# Patient Record
Sex: FEMALE | Race: WHITE | NOT HISPANIC OR LATINO | Employment: OTHER | URBAN - METROPOLITAN AREA
[De-identification: names, ages, dates, MRNs, and addresses within clinical notes are randomized per-mention and may not be internally consistent; named-entity substitution may affect disease eponyms.]

---

## 2023-11-22 ENCOUNTER — TRANSFERRED RECORDS (OUTPATIENT)
Dept: RADIATION THERAPY | Facility: OUTPATIENT CENTER | Age: 83
End: 2023-11-22

## 2023-12-01 ENCOUNTER — TRANSCRIBE ORDERS (OUTPATIENT)
Dept: OTHER | Age: 83
End: 2023-12-01

## 2023-12-01 ENCOUNTER — PATIENT OUTREACH (OUTPATIENT)
Dept: ONCOLOGY | Facility: CLINIC | Age: 83
End: 2023-12-01
Payer: COMMERCIAL

## 2023-12-01 ENCOUNTER — PRE VISIT (OUTPATIENT)
Dept: ONCOLOGY | Facility: CLINIC | Age: 83
End: 2023-12-01
Payer: COMMERCIAL

## 2023-12-01 DIAGNOSIS — C50.911 INFILTRATING DUCTAL CARCINOMA OF BREAST, RIGHT (H): Primary | ICD-10-CM

## 2023-12-01 NOTE — TELEPHONE ENCOUNTER
Action    Action Taken 12/1/2023 2:25pm KEEM   Pt's chart has been marked for merged.     12/4/2023 2:08pm KEEM   I called HIM to follow up on the EPIC merge. Ph: [phone]- I left a detailed vm.     I'm not able to pull [facility] pappass into [facility].      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  She had only been to [facility] for a pill and thought that was at [facility].  IB to try and get a little more info     Imaging Received  December 13, 2023  10:25 AM  TJ   Action: Images from [facility] received and resolved to PACS.  Call to [facility] in film room to resolve mammos     Records Requested  TJ   Clinic name Comments/Action Taken   [facility] December 11, 2023 1:45 PM    Called and requested all breast imaging for past 5 years be pushed to PACS     RECORDS STATUS - ALL OTHER DIAGNOSIS      RECORDS RECEIVED FROM: [facility]    Appt Date: TBD NN WQ   NOTES STATUS DETAILS   OFFICE NOTE from referring provider Complete  infiltrating ductal carcinoma of breast, R  to [facility], [place]  per Dr Xiao Aerllano [place]  [phone]  [phone]    OFFICE NOTE from medical oncologist     DISCHARGE SUMMARY from hospital     DISCHARGE REPORT from the ER     OPERATIVE REPORT     MEDICATION LIST     CLINICAL TRIAL TREATMENTS TO DATE     LABS     PATHOLOGY REPORTS     ANYTHING RELATED TO DIAGNOSIS     GENONOMIC TESTING     TYPE:     IMAGING (NEED IMAGES & REPORT)     CT SCANS     MRI     MAMMO     ULTRASOUND     PET

## 2023-12-01 NOTE — PROGRESS NOTES
New Patient Oncology Nurse Navigator Note     Referring provider: Dr. Tati Hagen     Referring Clinic/Organization: [facility]     Referred to (specialty:) Radiation Oncology     Requested provider (if applicable): [place] location     Date Referral Received: December 1, 2023     Evaluation for:  C50.911 (ICD-10-CM) - Infiltrating ductal carcinoma of breast, right (H)     Clinical History (per Nurse review of records provided):      Patient had bilateral screening mammogram on 9/18/23 and an asymmetry approximately 11 o'clock position of the RIGHT breast, 4 cm from the nipple.  Diagnostic imaging followed on 9/28 and the asymmetry at 11 o'clock area of RIGHT breast is persistent on these spot compression digital diagnostic views and a mass is present with  irregular borders.     This is confirmed on focused RIGHT breast ultrasound, where there is an irregularly marginated hypoechoic mass at 10 o'clock area, 6 cm from nipple, this measures 0.73 x 1.3 x 0.8 cm. Abnormal color flow    9/28/23 - Case: [id number]  A) RIGHT BREAST, 10:00, 6 CM FROM NIPPLE, ULTRASOUND-GUIDED CORE BIOPSY:   1. Invasive ductal carcinoma measuring up to 6 mm in this sampling      a. Andrei grade: II of III; Andrei score: 6 of 9      b. Angio-lymphatic invasion: Absent      c. Associated DCIS: Present      d. Subtype: Cribriform and Solid    2. Breast Ancillary Testing:        a. Hormone Receptors:             Estrogen receptor: Positive (99%, strong staining)             Progesterone receptor: Negative       b. HER2 by IHC: Negative (1+ by manual morphometry)          c. Ki-67: 8%    11/3/23 - Right lumpectomy with sentinel lymph node biopsy - [id number]  A) RIGHT BREAST, WIRE LOCALIZED LUMPECTOMY:   1. Invasive ductal carcinoma, [place] grade II of III       a. Size: 11 mm       b. Core biopsy site is associated with tumor   2. Ductal carcinoma in situ (DCIS), nuclear grade 2, cribriform and micropapillary types   3. Margins:        a. Invasive carcinoma is 6 mm from the superior margin       b. DCIS is 1 mm from the posterior margin   4. Breast Ancillary Testing: Performed on prior case ([id number])       a. Hormone Receptors:             Estrogen receptor: Positive (99%, strong staining)             Progesterone receptor: Negative       b. HER2 by IHC: Negative (1+ by manual morphometry)          c. Ki-67: 8%   5. Atypical lobular hyperplasia (ALH)   6. Radial scar, measuring 2.5 mm   7. Benign skin     B) RIGHT AXILLARY SENTINEL LYMPH NODE #1, EXCISION:   1. Negative for metastatic malignancy in one lymph node (0/1)     C) RIGHT AXILLARY SENTINEL LYMPH NODE #2, EXCISION:   1. Negative for metastatic malignancy in one lymph node (0/1)     D) RIGHT AXILLARY SENTINEL LYMPH NODE #3, EXCISION:   1. Negative for metastatic malignancy in one lymph node (0/1)     E) RIGHT AXILLARY SENTINEL LYMPH NODE #4, EXCISION:   1. Negative for metastatic malignancy in one lymph node (0/1)     Oncotype was ordered off of lumpectomy specimen and RS = 15    Had consult at [facility] but prefers [place].      Records Location: Care Everywhere, Media, and See Bookmarked material     Records Needed:   [facility] [facility] consult notes  All breast imaging for past 5 years (No imaging in PACS as of 12/1)    12/4 14:46 - Telephoned and left voice message for patient requesting call back to assist in scheduling consult.

## 2023-12-14 ENCOUNTER — OFFICE VISIT (OUTPATIENT)
Dept: RADIATION THERAPY | Facility: OUTPATIENT CENTER | Age: 83
End: 2023-12-14
Payer: COMMERCIAL

## 2023-12-14 VITALS
WEIGHT: 167.6 LBS | DIASTOLIC BLOOD PRESSURE: 92 MMHG | TEMPERATURE: 97.5 F | OXYGEN SATURATION: 100 % | HEART RATE: 69 BPM | SYSTOLIC BLOOD PRESSURE: 185 MMHG

## 2023-12-14 DIAGNOSIS — C50.911 MALIGNANT NEOPLASM OF RIGHT BREAST IN FEMALE, ESTROGEN RECEPTOR POSITIVE, UNSPECIFIED SITE OF BREAST (H): Primary | ICD-10-CM

## 2023-12-14 DIAGNOSIS — Z17.0 MALIGNANT NEOPLASM OF RIGHT BREAST IN FEMALE, ESTROGEN RECEPTOR POSITIVE, UNSPECIFIED SITE OF BREAST (H): Primary | ICD-10-CM

## 2023-12-14 PROBLEM — J30.89 OTHER ALLERGIC RHINITIS: Status: ACTIVE | Noted: 2020-02-14

## 2023-12-14 PROBLEM — L30.9 DERMATITIS: Status: ACTIVE | Noted: 2023-07-16

## 2023-12-14 PROBLEM — E78.5 HYPERLIPIDEMIA: Status: ACTIVE | Noted: 2017-05-17

## 2023-12-14 PROBLEM — N25.81 SECONDARY HYPERPARATHYROIDISM OF RENAL ORIGIN (H): Status: ACTIVE | Noted: 2022-01-25

## 2023-12-14 PROBLEM — K62.1 COLORECTAL POLYPS: Status: ACTIVE | Noted: 2023-12-14

## 2023-12-14 PROBLEM — K57.30 DIVERTICULOSIS OF LARGE INTESTINE WITHOUT HEMORRHAGE: Status: ACTIVE | Noted: 2023-12-14

## 2023-12-14 PROBLEM — M54.17 RIGHT LUMBOSACRAL RADICULOPATHY: Status: ACTIVE | Noted: 2018-12-04

## 2023-12-14 PROBLEM — R73.01 IFG (IMPAIRED FASTING GLUCOSE): Status: ACTIVE | Noted: 2017-05-17

## 2023-12-14 PROBLEM — R53.83 OTHER FATIGUE: Status: ACTIVE | Noted: 2020-08-19

## 2023-12-14 PROBLEM — I10 HYPERTENSION: Status: ACTIVE | Noted: 2020-02-14

## 2023-12-14 PROBLEM — K57.30 DIVERTICULOSIS OF COLON: Status: ACTIVE | Noted: 2023-12-14

## 2023-12-14 PROBLEM — Z83.719 FAMILY HISTORY OF COLONIC POLYPS: Status: ACTIVE | Noted: 2023-12-14

## 2023-12-14 PROBLEM — K59.09 CHRONIC CONSTIPATION: Status: ACTIVE | Noted: 2023-07-16

## 2023-12-14 PROBLEM — E55.9 VITAMIN D DEFICIENCY: Status: ACTIVE | Noted: 2018-04-25

## 2023-12-14 PROBLEM — E83.52 HYPERCALCEMIA: Status: ACTIVE | Noted: 2020-08-19

## 2023-12-14 PROBLEM — G45.8 STEAL SYNDROME, SUBCLAVIAN: Status: ACTIVE | Noted: 2018-08-08

## 2023-12-14 PROBLEM — K63.5 COLORECTAL POLYPS: Status: ACTIVE | Noted: 2023-12-14

## 2023-12-14 PROBLEM — Z86.0101 HX OF ADENOMATOUS COLONIC POLYPS: Status: ACTIVE | Noted: 2023-12-14

## 2023-12-14 PROBLEM — I71.43 ANEURYSM OF INFRARENAL ABDOMINAL AORTA (H): Status: ACTIVE | Noted: 2018-12-04

## 2023-12-14 RX ORDER — KETOCONAZOLE 20 MG/G
CREAM TOPICAL
COMMUNITY

## 2023-12-14 RX ORDER — TRIAMCINOLONE ACETONIDE 1 MG/G
OINTMENT TOPICAL 2 TIMES DAILY
COMMUNITY

## 2023-12-14 RX ORDER — LEVOTHYROXINE SODIUM 88 UG/1
88 TABLET ORAL DAILY
COMMUNITY
Start: 2022-08-05

## 2023-12-14 RX ORDER — VITAMIN B COMPLEX
1000 TABLET ORAL DAILY
COMMUNITY
Start: 2022-10-19

## 2023-12-14 RX ORDER — ASPIRIN 81 MG/1
81 TABLET ORAL DAILY
COMMUNITY

## 2023-12-14 RX ORDER — LISINOPRIL 40 MG/1
40 TABLET ORAL DAILY
COMMUNITY
Start: 2022-08-05

## 2023-12-14 RX ORDER — FLUOCINONIDE 0.5 MG/G
OINTMENT TOPICAL
COMMUNITY
Start: 2022-01-25

## 2023-12-14 RX ORDER — FAMOTIDINE 20 MG/1
20 TABLET, FILM COATED ORAL 2 TIMES DAILY PRN
COMMUNITY
Start: 2023-10-02

## 2023-12-14 RX ORDER — ALBUTEROL SULFATE 90 UG/1
2 AEROSOL, METERED RESPIRATORY (INHALATION) EVERY 4 HOURS PRN
COMMUNITY
Start: 2023-03-07

## 2023-12-14 RX ORDER — METOPROLOL SUCCINATE 25 MG/1
37.5 TABLET, EXTENDED RELEASE ORAL DAILY
COMMUNITY
Start: 2023-12-12

## 2023-12-14 RX ORDER — CLOBETASOL PROPIONATE 0.5 MG/G
OINTMENT TOPICAL 2 TIMES DAILY
COMMUNITY

## 2023-12-14 RX ORDER — FLUTICASONE PROPIONATE 50 MCG
2 SPRAY, SUSPENSION (ML) NASAL DAILY PRN
COMMUNITY
Start: 2023-10-02

## 2023-12-14 RX ORDER — LORATADINE 10 MG
TABLET ORAL
COMMUNITY

## 2023-12-14 RX ORDER — LOVASTATIN 40 MG
40 TABLET ORAL AT BEDTIME
COMMUNITY
Start: 2023-10-02

## 2023-12-14 RX ORDER — AZITHROMYCIN 250 MG/1
TABLET, FILM COATED ORAL
COMMUNITY
Start: 2023-12-12

## 2023-12-14 ASSESSMENT — PAIN SCALES - GENERAL: PAINLEVEL: NO PAIN (0)

## 2023-12-14 NOTE — NURSING NOTE
REASON FOR APPOINTMENT   Type of Cancer: R breast IDC ER+/ME- HER2-  Location: R breast  Date of Symptom Onset: 9/18/23 Bilateral digital screening mammogram    TREATMENT TO-DATE FOR THIS CANCER  Surgery ? 11/3/23 R wire localized lumpectomy with R SLN bx Dr. Adams   Chemotherapy ? no   Other Treatments for this Cancer ? no    PERSONAL HISTORY OF CANCER   Previous Cancer ? no   Prior Radiation ? no   Prior Chemotherapy ? no   Prior Hormonal Therapy ? no     REFERRALS NEEDED  no    VITALS  BP (!) 185/92   Pulse 69   Temp 97.5  F (36.4  C) (Oral)   Wt 76 kg (167 lb 9.6 oz)   SpO2 100%     PACEMAKER/IMPLANTED CARDIAC DEVICE no    PAIN  Denies- reports occasional pain to surgical site    PSYCHOSOCIAL  Marital Status:   Patient lives in home with alone.  Number of children: 3  Working status: Retired  Do you feel safe in your home? Yes    REVIEW OF SYSTEMS  Skin: positive for psoriasis  Eyes: negative  Ears/Nose/Throat: positive for hearing loss, tinnitus, hoarseness  Respiratory: No shortness of breath, dyspnea on exertion, cough, or hemoptysis  Cardiovascular: negative  Gastrointestinal: positive for constipation  Genitourinary: positive for frequency, urgency, and incontinence  Musculoskeletal: positive for neck pain, arthritis, and joint pain  Neurologic: negative  Psychiatric: negative  Hematologic/Lymphatic/Immunologic: positive for swelling in ankles  Endocrine: positive for thyroid disorder    WOMEN ONLY  Any chance you may be pregnant: No  Age at first period: 16  Date of last period: Age 44  Number of pregnancies: 3  Live Births: 3  Age at 1st delivery: 23  Breastfeeding: No  HRT: No  Birth Control pills: No  IUD: 5 years    Radiation Oncology Patient Teaching    Current Concern: R breast cancer    Person involved with teaching: Patient  Patient asked Questions: Yes  Patient was cooperative: Yes  Patient was receptive (willing to accept information given): Yes    Education Assessment  Comprehension  ability: High  Knowledge level: Medium  Factors affecting teaching: None    Education Materials Given  Radiation Therapy and You  Caring for Your Skin During Radiation  Fatigue    Educational Topics Discussed  Side effects- see above    Response To Teaching  Verbalizes understanding    Do you have an advanced directive or living will? No  Are you DNR/DNI? No    Yady Cardoso RN

## 2023-12-14 NOTE — LETTER
2023         RE: Jaquelin Martinez  [address] [facility] [address]        Dear Colleague,    Thank you for referring your patient, Jaquelin Martinez, to the [facility] RAD[facility]ATION [facility]. Please see a copy of my visit note below.       Department of Radiation Oncology  Radiation Therapy Center  [facility]  [address], [address]  [place] [address]  [phone]       Consultation Note    Name: Jaquelin Martinez MRN: [id number]   : 1940   Date of Service: 2023  Referring: Dr. 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  Final pathology demonstrated IDC, 11 mm, positive DCIS (grade 2), no LVSI, negative invasive margin, negative DCIS margin (close 1 mm), 0 out of 4 sentinel lymph node, pathologic T1c N0, hormone receptor positive, HER2 negative, Oncotype Dx recurrence score was 5.  Evaluate potential role for radiation therapy.    History of Present Illness   Ms. Martinez is a 83 year old female with a diagnosis of right breast cancer.     The patient underwent a bilateral screening mammogram in 2023 which demonstrated an asymmetry in the right breast at the 11 o'clock position, 4 cm from the nipple.  On 2023 biopsy of the asymmetry demonstrated IDC, grade 2, ER positive, CA negative, HER2 negative.  Ultrasound of the contralateral breast was negative.  On 11/3/2023 the patient underwent right breast lumpectomy and sentinel lymph node evaluation by Dr. Adams.  Final pathology demonstrated IDC, 11 mm, positive DCIS (grade 2), no LVSI, negative invasive margin, negative DCIS margin (close 1 mm), 0 out of 4 sentinel lymph node, pathologic T1c N0, hormone receptor positive, HER2 negative.  The patient was seen by Dr. Hagen of medical oncology team who ordered Oncotype.  Recurrence score returned low at 5.  No adjuvant systemic chemotherapy  was recommended.  Antiendocrine therapy was discussed.  The patient presents today to discuss potential role of radiation therapy as a part of treatment strategy.    She is overall doing fairly well.  Does have postoperative seroma which is improving.  No prior radiation.  No pacemaker.  No history of connective tissue disorder.    Past Medical History:   No past medical history on file.    Past Surgical History:   No past surgical history on file.    Chemotherapy History:  None    Radiation History:  None    Pregnant: Not Applicable  Implanted Cardiac Devices: No    Medications:  No current outpatient medications on file.     No current facility-administered medications for this visit.         Allergies:   Not on File        Family History:  No family history on file.    Review of Systems   A 10-point review of systems was performed. Pertinent findings are noted in the HPI.    Physical Exam   ECOG Status: 1    Vitals:  There were no vitals taken for this visit.    Gen: Alert, in NAD  Head: NC/AT  Eyes: PERRL, EOMI, sclera anicteric  Ears: No external auricular lesions  Nose/sinus: No rhinorrhea or epistaxis  Oral cavity/oropharynx: MMM, no visible oral cavity lesions, FOM and BOT are soft to palpation  Neck: Full ROM, supple, no palpable adenopathy  Pulm: No wheezing, stridor or respiratory distress  CV: Extremities are warm and well-perfused, no cyanosis, no pedal edema  Abdominal: Normal bowel sounds, soft, nontender, no masses  Musculoskeletal: No issues with ROM or lymphedema   Skin: Normal color and turgor  Neuro: A/Ox3, CN II-XII intact, normal gait    Imaging/Path/Labs   Imaging:   Per HPI    Path:   9/28/23  A) RIGHT BREAST, 10:00, 6 CM FROM NIPPLE, ULTRASOUND-GUIDED CORE BIOPSY:   1. Invasive ductal carcinoma measuring up to 6 mm in this sampling      a. Andrei grade: II of III; Andrei score: 6 of 9      b. Angio-lymphatic invasion: Absent      c. Associated DCIS: Present      d. Subtype:  Cribriform and Solid    2. Breast Ancillary Testing:        a. Hormone Receptors:             Estrogen receptor: Positive (99%, strong staining)             Progesterone receptor: Negative       b. HER2 by IHC: Negative (1+ by manual morphometry)          c. Ki-67: 8%     11/3/23    A) RIGHT BREAST, WIRE LOCALIZED LUMPECTOMY:  1. Invasive ductal carcinoma, [place] grade II of III      a. Size: 11 mm      b. Core biopsy site is associated with tumor  2. Ductal carcinoma in situ (DCIS), nuclear grade 2, cribriform and micropapillary types  3. Margins:      a. Invasive carcinoma is 6 mm from the superior margin      b. DCIS is 1 mm from the posterior margin  4. Breast Ancillary Testing: Performed on prior case ([id number])      a. Hormone Receptors:            Estrogen receptor: Positive (99%, strong staining)            Progesterone receptor: Negative      b. HER2 by IHC: Negative (1+ by manual morphometry)          c. Ki-67: 8%  5. Atypical lobular hyperplasia (ALH)  6. Radial scar, measuring 2.5 mm  7. Benign skin    B) RIGHT AXILLARY SENTINEL LYMPH NODE #1, EXCISION:  1. Negative for metastatic malignancy in one lymph node (0/1)    C) RIGHT AXILLARY SENTINEL LYMPH NODE #2, EXCISION:  1. Negative for metastatic malignancy in one lymph node (0/1)    D) RIGHT AXILLARY SENTINEL LYMPH NODE #3, EXCISION:  1. Negative for metastatic malignancy in one lymph node (0/1)    E) RIGHT AXILLARY SENTINEL LYMPH NODE #4, EXCISION:  1. Negative for metastatic malignancy in one lymph node (0/1)    Amendment electronically signed by Richard Santos MD on 12/6/2023 at 12:59 PM Electronically signed by Azucena Guo MD on 11/7/2023 at 11:44 AM   Comment Note is made of the Oncotype results which revealed a POSITIVE result for GA via RT-PCR on this case, in contrast to studies performed in our lab which revealed a negative result for GA by Immunohistochemistry (IHC) performed on the original core biopsy (case  "[id number]).    Review of the original WA IHC stained slide ([id number]) shows focal staining within the invasive tumor cells, which did not meet a positive result threshold at the time of evaluation. If clinically indicated, WA testing can be repeated on this breast lumpectomy specimen, upon clinical request.   Clinical Information Right breast invasive ductal carcinoma, [place] grade II with ([id number]).   Gross Description A) Received fresh, labeled with the patient's name and \"right breast,\" is a 58 gram, 8 (M-L) x 6.5 (S-I) x 3.6 (A-P) cm wire localized breast lumpectomy specimen. Anteriorly, there is a portion of overlying skin with orange ink measuring 5 x 2.5 cm.  The specimen is inked by the surgical staff in the OR as follows:  Anterior--Orange  Posterior--Black  Superior--Blue  Inferior--Red  Medial--Green  Lateral--Yellow    The specimen is serially sectioned from medial to lateral into 9 slices revealing a 1.2 (A-P) x 0.9 (S-I) x 0.8 (M-L) cm indurated tan mass within slice(s) 3-5 with the following characteristics:  Biopsy site change: Present in slices 4-8  Biopsy clip: Is grossly identified  Closest margin: Superior  Distance to margins:  Anterior: 1 cm  Posterior: 0.9 cm  Inferior: 1.9 cm  Superior: 0.6 cm  Medial: 1.4 cm  Lateral: Greater than 2 cm    The remaining cut surfaces consist of approximately 90% adipose tissue with areas of indurated fat necrosis surrounding the biopsy hematoma and 10% fibrous tissue. No other lesions are identified.    Representative sections are submitted as follows:  1.   Sections perpendicular to medial margin (from slice 1)  2.   Middle third of slice 2, adjacent to tumor  3.   Superior posterior quadrant with tumor, slice 3  4-7. Composite slice 4 with tumor and biopsy site  8.   Anterior superior quadrant, slice 5 with remainder of tumor and biopsy hematoma  9.   Anterior superior quadrant, slice 6 with biopsy hematoma and fat necrosis  10.  Biopsy hematoma in " "slice 7 with relation to overlying skin  11.  Anterior superior quadrant, slice 8 with hematoma and skin  12.  Sections perpendicular to lateral margin adjacent to biopsy hematoma, from slice 9    An annotated photograph including sections taken is uploaded to the case.    Time removed from patient: 1157  Time placed in formalin: 1215  Date removed and placed in formalin: 11/3/2023  Cold ischemic time < 60 minutes. The specimen was fixed in formalin for a minimum of 6 hours and not longer than 72 hours.    TRB 11/3/2023    B) Received in formalin, labeled with the patient's name and \"right axillary sentinel lymph node 1,\" is a 2.5 x 2.3 x 1.2 cm segment of yellow-tan fibroadipose tissue containing a 2.2 x 1.3 x 0.8 cm tan-yellow potential lymph node.  The possible lymph node is serially sectioned to reveal a tan-white cut surface.  A biopsy cavity is not grossly identified.  The possible lymph node is entirely submitted in 2 cassettes.    Time removed from patient: 1130  Time placed in formalin: 1138  Date removed and placed in formalin: 11/3/2023  Cold ischemic time < 60 minutes. The specimen was fixed in formalin for a minimum of 6 hours and not longer than 72 hours.    C) Received in formalin, labeled with the patient's name and \"right axillary sentinel lymph node 2\", is a 2.3 x 1.8 x 1.4 cm segment of yellow-tan fibroadipose tissue containing a 2.1 x 1.3 x 0.6 cm yellow-tan possible lymph node.  A biopsy cavity is not present.  The possible lymph node is trisected and entirely submitted in 2 cassettes.    Time removed from patient: 1130  Time placed in formalin: 1130  Date removed and placed in formalin: 11/3/2023  Cold ischemic time < 60 minutes. The specimen was fixed in formalin for a minimum of 6 hours and not longer than 72 hours.    D) Received in formalin, labeled with the patient's name and \"right axillary sentinel lymph node 3,\" is a 2.2 x 1.7 x 1.2 cm segment of yellow-tan fibroadipose tissue " "containing a 1.9 x 1.0 x 0.6 cm possible lymph node.  A biopsy cavity is not identified.  The possible lymph node is trisected and entirely submitted in 1 cassette.    Time removed from patient: 1131  Time placed in formalin: 1131  Date removed and placed in formalin: 11/3/2023  Cold ischemic time < 60 minutes. The specimen was fixed in formalin for a minimum of 6 hours and not longer than 72 hours.    E) Received in formalin, labeled with the patient's name and \"right axillary lymph node #4,\" is a 2.8 x 2.1 x 1.2 cm segment of yellow-tan fibroadipose tissue containing a 1.5 x 1.0 x 0.4 cm tan-pink possible lymph node.  A biopsy cavity is not grossly identified.  The possible lymph node is bisected and entirely submitted in 1 cassette.    Time removed from patient: 1129  Time placed in formalin: 1130  Date removed and placed in formalin: 11/3/2023  Cold ischemic time < 60 minutes. The specimen was fixed in formalin for a minimum of 6 hours and not longer than 72 hours.    DS 11/4/2023   Intraoperative Consultation A) RIGHT BREAST, LUMPECTOMY, INTRAOPERATIVE CONSULTATION (Gross Evaluation Only):  1. Tumor grossly identified  2. Biopsy site change is identified grossly  3. Margins are grossly negative    China Castillo MD, 11/3/2023 12:16 PM      Intraoperative consultation, which may have included frozen section preparation, gross specimen examination, and/or cytology touch imprints/smears, was performed by a pathologist during the surgical procedure.  This testing was performed at:    [place] [id number]   Microscopic Description The final diagnosis is based on microscopic examination of appropriate sections of all specimens.   SYNOPTIC REPORTING INVASIVE CARCINOMA OF THE BREAST: Resection  INVASIVE CARCINOMA OF THE BREAST: RESECTION - All Specimens  8th Edition - Protocol posted: 6/21/2023    SPECIMEN     Procedure:    Excision (less than total mastectomy)     Specimen " Laterality:    Right    TUMOR     Tumor Site:    Clock position     :    10 o'clock   Tumor Site:    Distance from nipple (Centimeters): 6 cm   Histologic Type:    Invasive carcinoma of no special type (ductal)   Histologic Grade ([place] Histologic Score):         Glandular (Acinar) / Tubular Differentiation:    Score 3     Nuclear Pleomorphism:    Score 2     Mitotic Rate:    Score 1     Overall Grade:    Grade 2 (scores of 6 or 7)   Tumor Size:    Greatest dimension of largest invasive focus (Millimeters): 11 mm   Tumor Focality:    Single focus of invasive carcinoma   Ductal Carcinoma In Situ (DCIS):    Present     :    Negative for extensive intraductal component (EIC)     Architectural Patterns:    Cribriform     Architectural Patterns:    Micropapillary     Nuclear Grade:    Grade II (intermediate)     Necrosis:    Present, focal (small foci or single cell necrosis)   Lobular Carcinoma In Situ (LCIS):    Not identified   Lymphatic and / or Vascular Invasion:    Not identified   Dermal Lymphatic and / or Vascular Invasion:    Not identified   Microcalcifications:    Present in DCIS   Microcalcifications:    Present in non-neoplastic tissue   Treatment Effect in the Breast:    No known presurgical therapy    MARGINS   Margin Status for Invasive Carcinoma:    All margins negative for invasive carcinoma     Distance from Invasive Carcinoma to Closest Margin:    6 mm     Closest Margin(s) to Invasive Carcinoma:    Superior   Margin Status for DCIS:    All margins negative for DCIS     Distance from DCIS to Closest Margin:    1 mm     Closest Margin(s) to DCIS:    Posterior    REGIONAL LYMPH NODES   Regional Lymph Node Status:         :    All regional lymph nodes negative for tumor     Total Number of Lymph Nodes Examined (sentinel and non-sentinel):    4     Number of [place] Nodes Examined:    4    pTNM CLASSIFICATION (AJCC 8th Edition)     Reporting of pT, pN, and (when applicable) pM categories is based on  information available to the pathologist at the time the report is issued. As per the AJCC (Chapter 1, 8th Ed.) it is the managing physician s responsibility to establish the final pathologic stage based upon all pertinent information, including but potentially not limited to this pathology report.   pT Category:    pT1c   pN Category:    pN0   N Suffix:    (sn)         Assessment    Ms. Martinez is a 83 year old female with a diagnosis of invasive ductal carcinoma of the right breast status post lumpectomy and sentinel lymph node evaluation.  Final pathology demonstrated IDC, 11 mm, positive DCIS (grade 2), no LVSI, negative invasive margin, negative DCIS margin (close 1 mm), 0 out of 4 sentinel lymph node, pathologic T1c N0, hormone receptor positive, HER2 negative, Oncotype Dx recurrence score was 5.  Evaluate potential role for radiation therapy.    Plan     We discussed the role of adjuvant radiation therapy the potential role of adjuvant radiation. We discussed that adjuvant radiation therapy to the whole breast following lumpectomy is the standard of care. The most recent update of the EBCTCG metaanalysis for early stage breast cancer showed that for patients with pathologically node negative disease, radiotherapy reduces the 5 year risk of any recurrence from 31% to 15% (Lancet, 2011).  This translated into a 3.3% absolute survival benefit at 15 years for all-comers.      However, this benefit is reduced for older patients. In the CALGB 9343 (Karolina, JCO 2013) trial, patients over age 70 with hormone positive invasive ductal carcinoma after lumpectomy were treated with tamoxifen alone versus tamoxifen plus radiation. They experienced locoregional recurrence of 2% versus 10% at 10 years, but there was no difference in overall survival. Given the lack of survival advantage, patients often play a role in the decision-making process, with patients weighing the risks of local recurrence versus the side effects of  radiation.    We did discuss pathology in great detail.  Technically she has negative invasive as well as DCIS margin.  She does have close DCIS margin but negative.  Given close DCIS margin, she may have a slightly higher risk of recurrence.  However radiation therapy will still not likely provide overall survival benefit.  As such, I feel it is reasonable to proceed with radiation versus observation.     Should the patient elect for treatment, we would recommend hypofractionated radiation therapy based upon the results from the [place] hypofractionation trial (Duke et al, [place], 2010) and the recent update of the [place] START trial (Fox et al, Lancet Oncol, 2013), which both show equivalent local control, survival and cosmesis with these shorter treatment schedules as compared to conventional radiation fractionation.      We also discussed the logistics of treatment planning and delivery in detail with the patient. We furthermore discussed side effects, including short term risks of fatigue and skin reaction, and long term risks of pneumonitis, lung fibrosis, soft tissue fibrosis, skin changes, breast contour changes, rib fractures, cardiac damage, secondary cancers, lymphedema, and thyroid dysfunction.  We described the use of 3D-conformal radiotherapy to minimize dose to the normal tissues, while adequately covering the target tissues, and the ability of this technique to decrease potential for toxicity.       The risks and benefits of radiation therapy were discussed in detail with the patient.  The patient wishes to think about her options at this time.  If she elects for adjuvant radiation she will get back to us and we will schedule CT simulation accordingly.    Kofi Elliott MD  Department of Radiation Oncology  [facility]

## 2023-12-14 NOTE — PROGRESS NOTES
Department of Radiation Oncology  Radiation Therapy Center  [facility]  [address], [address]  [place] [address]  [phone]       Consultation Note    Name: Jaquelin Martinez MRN: [id number]   : 1940   Date of Service: 2023  Referring: Dr. 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  Final pathology demonstrated IDC, 11 mm, positive DCIS (grade 2), no LVSI, negative invasive margin, negative DCIS margin (close 1 mm), 0 out of 4 sentinel lymph node, pathologic T1c N0, hormone receptor positive, HER2 negative, Oncotype Dx recurrence score was 5.  Evaluate potential role for radiation therapy.    History of Present Illness   Ms. Martinez is a 83 year old female with a diagnosis of right breast cancer.     The patient underwent a bilateral screening mammogram in 2023 which demonstrated an asymmetry in the right breast at the 11 o'clock position, 4 cm from the nipple.  On 2023 biopsy of the asymmetry demonstrated IDC, grade 2, ER positive, TX negative, HER2 negative.  Ultrasound of the contralateral breast was negative.  On 11/3/2023 the patient underwent right breast lumpectomy and sentinel lymph node evaluation by Dr. Adams.  Final pathology demonstrated IDC, 11 mm, positive DCIS (grade 2), no LVSI, negative invasive margin, negative DCIS margin (close 1 mm), 0 out of 4 sentinel lymph node, pathologic T1c N0, hormone receptor positive, HER2 negative.  The patient was seen by Dr. Hagen of medical oncology team who ordered Oncotype.  Recurrence score returned low at 5.  No adjuvant systemic chemotherapy was recommended.  Antiendocrine therapy was discussed.  The patient presents today to discuss potential role of radiation therapy as a part of treatment strategy.    She is overall doing fairly well.  Does have postoperative seroma which is improving.  No prior  radiation.  No pacemaker.  No history of connective tissue disorder.    Past Medical History:   No past medical history on file.    Past Surgical History:   No past surgical history on file.    Chemotherapy History:  None    Radiation History:  None    Pregnant: Not Applicable  Implanted Cardiac Devices: No    Medications:  No current outpatient medications on file.     No current facility-administered medications for this visit.         Allergies:   Not on File        Family History:  No family history on file.    Review of Systems   A 10-point review of systems was performed. Pertinent findings are noted in the HPI.    Physical Exam   ECOG Status: 1    Vitals:  There were no vitals taken for this visit.    Gen: Alert, in NAD  Head: NC/AT  Eyes: PERRL, EOMI, sclera anicteric  Ears: No external auricular lesions  Nose/sinus: No rhinorrhea or epistaxis  Oral cavity/oropharynx: MMM, no visible oral cavity lesions, FOM and BOT are soft to palpation  Neck: Full ROM, supple, no palpable adenopathy  Pulm: No wheezing, stridor or respiratory distress  CV: Extremities are warm and well-perfused, no cyanosis, no pedal edema  Abdominal: Normal bowel sounds, soft, nontender, no masses  Musculoskeletal: No issues with ROM or lymphedema   Skin: Normal color and turgor  Neuro: A/Ox3, CN II-XII intact, normal gait    Imaging/Path/Labs   Imaging:   Per HPI    Path:   9/28/23  A) RIGHT BREAST, 10:00, 6 CM FROM NIPPLE, ULTRASOUND-GUIDED CORE BIOPSY:   1. Invasive ductal carcinoma measuring up to 6 mm in this sampling      a. Andrei grade: II of III; Axtell score: 6 of 9      b. Angio-lymphatic invasion: Absent      c. Associated DCIS: Present      d. Subtype: Cribriform and Solid    2. Breast Ancillary Testing:        a. Hormone Receptors:             Estrogen receptor: Positive (99%, strong staining)             Progesterone receptor: Negative       b. HER2 by IHC: Negative (1+ by manual morphometry)          c. Ki-67: 8%      11/3/23    A) RIGHT BREAST, WIRE LOCALIZED LUMPECTOMY:  1. Invasive ductal carcinoma, Dixon grade II of III      a. Size: 11 mm      b. Core biopsy site is associated with tumor  2. Ductal carcinoma in situ (DCIS), nuclear grade 2, cribriform and micropapillary types  3. Margins:      a. Invasive carcinoma is 6 mm from the superior margin      b. DCIS is 1 mm from the posterior margin  4. Breast Ancillary Testing: Performed on prior case ([id number])      a. Hormone Receptors:            Estrogen receptor: Positive (99%, strong staining)            Progesterone receptor: Negative      b. HER2 by IHC: Negative (1+ by manual morphometry)          c. Ki-67: 8%  5. Atypical lobular hyperplasia (ALH)  6. Radial scar, measuring 2.5 mm  7. Benign skin    B) RIGHT AXILLARY SENTINEL LYMPH NODE #1, EXCISION:  1. Negative for metastatic malignancy in one lymph node (0/1)    C) RIGHT AXILLARY SENTINEL LYMPH NODE #2, EXCISION:  1. Negative for metastatic malignancy in one lymph node (0/1)    D) RIGHT AXILLARY SENTINEL LYMPH NODE #3, EXCISION:  1. Negative for metastatic malignancy in one lymph node (0/1)    E) RIGHT AXILLARY SENTINEL LYMPH NODE #4, EXCISION:  1. Negative for metastatic malignancy in one lymph node (0/1)    Amendment electronically signed by Richard Santos MD on 12/6/2023 at 12:59 PM Electronically signed by Azucena Guo MD on 11/7/2023 at 11:44 AM   Comment Note is made of the Oncotype results which revealed a POSITIVE result for NV via RT-PCR on this case, in contrast to studies performed in our lab which revealed a negative result for NV by Immunohistochemistry (IHC) performed on the original core biopsy (case [id number]).    Review of the original NV IHC stained slide ([id number]) shows focal staining within the invasive tumor cells, which did not meet a positive result threshold at the time of evaluation. If clinically indicated, NV testing can be repeated on this breast lumpectomy  "specimen, upon clinical request.   Clinical Information Right breast invasive ductal carcinoma, [place] grade II with ([id number]).   Gross Description A) Received fresh, labeled with the patient's name and \"right breast,\" is a 58 gram, 8 (M-L) x 6.5 (S-I) x 3.6 (A-P) cm wire localized breast lumpectomy specimen. Anteriorly, there is a portion of overlying skin with orange ink measuring 5 x 2.5 cm.  The specimen is inked by the surgical staff in the OR as follows:  Anterior--Orange  Posterior--Black  Superior--Blue  Inferior--Red  Medial--Green  Lateral--Yellow    The specimen is serially sectioned from medial to lateral into 9 slices revealing a 1.2 (A-P) x 0.9 (S-I) x 0.8 (M-L) cm indurated tan mass within slice(s) 3-5 with the following characteristics:  Biopsy site change: Present in slices 4-8  Biopsy clip: Is grossly identified  Closest margin: Superior  Distance to margins:  Anterior: 1 cm  Posterior: 0.9 cm  Inferior: 1.9 cm  Superior: 0.6 cm  Medial: 1.4 cm  Lateral: Greater than 2 cm    The remaining cut surfaces consist of approximately 90% adipose tissue with areas of indurated fat necrosis surrounding the biopsy hematoma and 10% fibrous tissue. No other lesions are identified.    Representative sections are submitted as follows:  1.   Sections perpendicular to medial margin (from slice 1)  2.   Middle third of slice 2, adjacent to tumor  3.   Superior posterior quadrant with tumor, slice 3  4-7. Composite slice 4 with tumor and biopsy site  8.   Anterior superior quadrant, slice 5 with remainder of tumor and biopsy hematoma  9.   Anterior superior quadrant, slice 6 with biopsy hematoma and fat necrosis  10.  Biopsy hematoma in slice 7 with relation to overlying skin  11.  Anterior superior quadrant, slice 8 with hematoma and skin  12.  Sections perpendicular to lateral margin adjacent to biopsy hematoma, from slice 9    An annotated photograph including sections taken is uploaded to the case.    Time " "removed from patient: 1157  Time placed in formalin: 1215  Date removed and placed in formalin: 11/3/2023  Cold ischemic time < 60 minutes. The specimen was fixed in formalin for a minimum of 6 hours and not longer than 72 hours.    TRB 11/3/2023    B) Received in formalin, labeled with the patient's name and \"right axillary sentinel lymph node 1,\" is a 2.5 x 2.3 x 1.2 cm segment of yellow-tan fibroadipose tissue containing a 2.2 x 1.3 x 0.8 cm tan-yellow potential lymph node.  The possible lymph node is serially sectioned to reveal a tan-white cut surface.  A biopsy cavity is not grossly identified.  The possible lymph node is entirely submitted in 2 cassettes.    Time removed from patient: 1130  Time placed in formalin: 1138  Date removed and placed in formalin: 11/3/2023  Cold ischemic time < 60 minutes. The specimen was fixed in formalin for a minimum of 6 hours and not longer than 72 hours.    C) Received in formalin, labeled with the patient's name and \"right axillary sentinel lymph node 2\", is a 2.3 x 1.8 x 1.4 cm segment of yellow-tan fibroadipose tissue containing a 2.1 x 1.3 x 0.6 cm yellow-tan possible lymph node.  A biopsy cavity is not present.  The possible lymph node is trisected and entirely submitted in 2 cassettes.    Time removed from patient: 1130  Time placed in formalin: 1130  Date removed and placed in formalin: 11/3/2023  Cold ischemic time < 60 minutes. The specimen was fixed in formalin for a minimum of 6 hours and not longer than 72 hours.    D) Received in formalin, labeled with the patient's name and \"right axillary sentinel lymph node 3,\" is a 2.2 x 1.7 x 1.2 cm segment of yellow-tan fibroadipose tissue containing a 1.9 x 1.0 x 0.6 cm possible lymph node.  A biopsy cavity is not identified.  The possible lymph node is trisected and entirely submitted in 1 cassette.    Time removed from patient: 1131  Time placed in formalin: 1131  Date removed and placed in formalin: 11/3/2023  Cold " "ischemic time < 60 minutes. The specimen was fixed in formalin for a minimum of 6 hours and not longer than 72 hours.    E) Received in formalin, labeled with the patient's name and \"right axillary lymph node #4,\" is a 2.8 x 2.1 x 1.2 cm segment of yellow-tan fibroadipose tissue containing a 1.5 x 1.0 x 0.4 cm tan-pink possible lymph node.  A biopsy cavity is not grossly identified.  The possible lymph node is bisected and entirely submitted in 1 cassette.    Time removed from patient: 1129  Time placed in formalin: 1130  Date removed and placed in formalin: 11/3/2023  Cold ischemic time < 60 minutes. The specimen was fixed in formalin for a minimum of 6 hours and not longer than 72 hours.    DS 11/4/2023   Intraoperative Consultation A) RIGHT BREAST, LUMPECTOMY, INTRAOPERATIVE CONSULTATION (Gross Evaluation Only):  1. Tumor grossly identified  2. Biopsy site change is identified grossly  3. Margins are grossly negative    China Castillo MD, 11/3/2023 12:16 PM      Intraoperative consultation, which may have included frozen section preparation, gross specimen examination, and/or cytology touch imprints/smears, was performed by a pathologist during the surgical procedure.  This testing was performed at:    [place] [address]   Microscopic Description The final diagnosis is based on microscopic examination of appropriate sections of all specimens.   SYNOPTIC REPORTING INVASIVE CARCINOMA OF THE BREAST: Resection  INVASIVE CARCINOMA OF THE BREAST: RESECTION - All Specimens  8th Edition - Protocol posted: 6/21/2023    SPECIMEN     Procedure:    Excision (less than total mastectomy)     Specimen Laterality:    Right    TUMOR     Tumor Site:    Clock position     :    10 o'clock   Tumor Site:    Distance from nipple (Centimeters): 6 cm   Histologic Type:    Invasive carcinoma of no special type (ductal)   Histologic Grade (Andrei Histologic Score):         Glandular " (Acinar) / Tubular Differentiation:    Score 3     Nuclear Pleomorphism:    Score 2     Mitotic Rate:    Score 1     Overall Grade:    Grade 2 (scores of 6 or 7)   Tumor Size:    Greatest dimension of largest invasive focus (Millimeters): 11 mm   Tumor Focality:    Single focus of invasive carcinoma   Ductal Carcinoma In Situ (DCIS):    Present     :    Negative for extensive intraductal component (EIC)     Architectural Patterns:    Cribriform     Architectural Patterns:    Micropapillary     Nuclear Grade:    Grade II (intermediate)     Necrosis:    Present, focal (small foci or single cell necrosis)   Lobular Carcinoma In Situ (LCIS):    Not identified   Lymphatic and / or Vascular Invasion:    Not identified   Dermal Lymphatic and / or Vascular Invasion:    Not identified   Microcalcifications:    Present in DCIS   Microcalcifications:    Present in non-neoplastic tissue   Treatment Effect in the Breast:    No known presurgical therapy    MARGINS   Margin Status for Invasive Carcinoma:    All margins negative for invasive carcinoma     Distance from Invasive Carcinoma to Closest Margin:    6 mm     Closest Margin(s) to Invasive Carcinoma:    Superior   Margin Status for DCIS:    All margins negative for DCIS     Distance from DCIS to Closest Margin:    1 mm     Closest Margin(s) to DCIS:    Posterior    REGIONAL LYMPH NODES   Regional Lymph Node Status:         :    All regional lymph nodes negative for tumor     Total Number of Lymph Nodes Examined (sentinel and non-sentinel):    4     Number of [place] Nodes Examined:    4    pTNM CLASSIFICATION (AJCC 8th Edition)     Reporting of pT, pN, and (when applicable) pM categories is based on information available to the pathologist at the time the report is issued. As per the AJCC (Chapter 1, 8th Ed.) it is the managing physician s responsibility to establish the final pathologic stage based upon all pertinent information, including but potentially not limited to  this pathology report.   pT Category:    pT1c   pN Category:    pN0   N Suffix:    (sn)         Assessment    Ms. Martinez is a 83 year old female with a diagnosis of invasive ductal carcinoma of the right breast status post lumpectomy and sentinel lymph node evaluation.  Final pathology demonstrated IDC, 11 mm, positive DCIS (grade 2), no LVSI, negative invasive margin, negative DCIS margin (close 1 mm), 0 out of 4 sentinel lymph node, pathologic T1c N0, hormone receptor positive, HER2 negative, Oncotype Dx recurrence score was 5.  Evaluate potential role for radiation therapy.    Plan     We discussed the role of adjuvant radiation therapy the potential role of adjuvant radiation. We discussed that adjuvant radiation therapy to the whole breast following lumpectomy is the standard of care. The most recent update of the EBCTCG metaanalysis for early stage breast cancer showed that for patients with pathologically node negative disease, radiotherapy reduces the 5 year risk of any recurrence from 31% to 15% (Lancet, 2011).  This translated into a 3.3% absolute survival benefit at 15 years for all-comers.      However, this benefit is reduced for older patients. In the CALGB 9343 (TRES Turcios 2013) trial, patients over age 70 with hormone positive invasive ductal carcinoma after lumpectomy were treated with tamoxifen alone versus tamoxifen plus radiation. They experienced locoregional recurrence of 2% versus 10% at 10 years, but there was no difference in overall survival. Given the lack of survival advantage, patients often play a role in the decision-making process, with patients weighing the risks of local recurrence versus the side effects of radiation.    We did discuss pathology in great detail.  Technically she has negative invasive as well as DCIS margin.  She does have close DCIS margin but negative.  Given close DCIS margin, she may have a slightly higher risk of recurrence.  However radiation therapy will  still not likely provide overall survival benefit.  As such, I feel it is reasonable to proceed with radiation versus observation.     Should the patient elect for treatment, we would recommend hypofractionated radiation therapy based upon the results from the [place] hypofractionation trial (Duke et al, NEJ, 2010) and the recent update of the [place] START trial (Fox et al, Lancet Oncol, 2013), which both show equivalent local control, survival and cosmesis with these shorter treatment schedules as compared to conventional radiation fractionation.      We also discussed the logistics of treatment planning and delivery in detail with the patient. We furthermore discussed side effects, including short term risks of fatigue and skin reaction, and long term risks of pneumonitis, lung fibrosis, soft tissue fibrosis, skin changes, breast contour changes, rib fractures, cardiac damage, secondary cancers, lymphedema, and thyroid dysfunction.  We described the use of 3D-conformal radiotherapy to minimize dose to the normal tissues, while adequately covering the target tissues, and the ability of this technique to decrease potential for toxicity.       The risks and benefits of radiation therapy were discussed in detail with the patient.  The patient wishes to think about her options at this time.  If she elects for adjuvant radiation she will get back to us and we will schedule CT simulation accordingly.    Kofi Elliott MD  Department of Radiation Oncology  [facility]